# Patient Record
(demographics unavailable — no encounter records)

---

## 2025-05-19 NOTE — DISCUSSION/SUMMARY
[FreeTextEntry1] : 27 year now  s/p rCS + BS on 25.  - [place] screen negative, mood appropriate, feels well taking care of baby - discussed continuing prenatal vitamins - discussed continued BF for 6 months, exclusively if possible (benefits explained) - discussed adequate diet and physical activity - Contraception: s/p bilateral salpingectomy  - Last PAP: 2022, NILM; HPV: NEG   She verbalized understanding and agreement with above counseling regarding differential diagnosis, evaluation, and plan. She was given time for questions/concerns which were all answered to her apparent satisfaction. All designated lab work for today drawn in office.   RTO 4w for follow up PP visit. -

## 2025-05-19 NOTE — REASON FOR VISIT
[Follow-Up] : a follow-up evaluation of [FreeTextEntry2] : POST OP FOR BLS DELIVERED ON 5/8/25 FEMAL 8LBS BOTTLE FEEDING

## 2025-05-19 NOTE — PHYSICAL EXAM
[Appropriately responsive] : appropriately responsive [Alert] : alert [No Acute Distress] : no acute distress [Oriented x3] : oriented x3 [FreeTextEntry7] : incision intact, healing well; no bleeding/drainage; no signs of infection/inflammation; firm fundus below umbilicus

## 2025-05-19 NOTE — END OF VISIT
[FreeTextEntry3] : I, Ebony Overton solely acted as scribe for Dr. Byron Chou on 05/19/2025 All medical entries made by the Scribe were at my, Dr. Chou, direction and personally dictated by me on 05/19/2025 . I have reviewed the chart and agree that the record accurately reflects my personal performance of the history, physical exam, assessment and plan. I have also personally directed, reviewed, and agreed with the chart.
